# Patient Record
Sex: MALE | Race: WHITE | NOT HISPANIC OR LATINO | Employment: FULL TIME | URBAN - METROPOLITAN AREA
[De-identification: names, ages, dates, MRNs, and addresses within clinical notes are randomized per-mention and may not be internally consistent; named-entity substitution may affect disease eponyms.]

---

## 2018-06-26 ENCOUNTER — OFFICE VISIT (OUTPATIENT)
Dept: NEUROLOGY | Facility: CLINIC | Age: 28
End: 2018-06-26
Payer: COMMERCIAL

## 2018-06-26 VITALS
WEIGHT: 127 LBS | DIASTOLIC BLOOD PRESSURE: 78 MMHG | SYSTOLIC BLOOD PRESSURE: 112 MMHG | HEART RATE: 91 BPM | HEIGHT: 68 IN | BODY MASS INDEX: 19.25 KG/M2

## 2018-06-26 DIAGNOSIS — G43.111 INTRACTABLE MIGRAINE WITH AURA WITH STATUS MIGRAINOSUS: ICD-10-CM

## 2018-06-26 DIAGNOSIS — IMO0002 CHRONIC MIGRAINE: Primary | ICD-10-CM

## 2018-06-26 PROCEDURE — 99244 OFF/OP CNSLTJ NEW/EST MOD 40: CPT | Performed by: PSYCHIATRY & NEUROLOGY

## 2018-06-26 RX ORDER — SUMATRIPTAN 100 MG/1
50 TABLET, FILM COATED ORAL ONCE AS NEEDED
COMMUNITY
End: 2018-06-26 | Stop reason: SDUPTHER

## 2018-06-26 RX ORDER — BUTALBITAL, ACETAMINOPHEN AND CAFFEINE 50; 325; 40 MG/1; MG/1; MG/1
1 TABLET ORAL DAILY PRN
Qty: 30 TABLET | Refills: 0 | Status: SHIPPED | OUTPATIENT
Start: 2018-06-26 | End: 2019-12-16 | Stop reason: ALTCHOICE

## 2018-06-26 RX ORDER — SUMATRIPTAN 100 MG/1
50 TABLET, FILM COATED ORAL ONCE AS NEEDED
Qty: 9 TABLET | Refills: 0 | Status: SHIPPED | OUTPATIENT
Start: 2018-06-26 | End: 2018-08-18 | Stop reason: SDUPTHER

## 2018-06-26 RX ORDER — VERAPAMIL HYDROCHLORIDE 120 MG/1
120 CAPSULE, EXTENDED RELEASE ORAL
Qty: 30 CAPSULE | Refills: 3 | Status: SHIPPED | OUTPATIENT
Start: 2018-06-26 | End: 2018-10-30 | Stop reason: SDUPTHER

## 2018-06-26 NOTE — PROGRESS NOTES
Outpatient Neurology History and Physical  Olive Beaver  [id number]  89 y o   1990          Consult: Yes    Mari Desai MD      Chief Complaint   Patient presents with    Migraine     NP           History Obtained from: Patient     HPI:     Mr Tha Watson is a 34 yo M with PMH of migraines presents to establish care  He started getting migraines at age of 22  At first it was thought to be due to use of cleaning agents but that was not the case  He describes It as starting out with visual aura, stabbing pain in eye ball, then headache builds on right side of head  He gets nausea and rarely vomiting with them  He gets very sensitive to light and noise  He says he was tried on topamax ( caused weight loss), elavil (sleepiness), propranolol and they were all ineffective  He takes imitrex daily by cutting it into 4  It works majority of time  He was ordered to have MRI brain but it was denied by his insurance  Coffee, stress, smell of gasoline can trigger them  He says he gets them daily and if he doesn't take triptan intensity is 10/10  They can last about 2-3 hours  Denies any focal paresthesia or weakness  His grandmother had h/o migraines  Past Medical History:   Diagnosis Date    Chronic migraine     Migraines                No current outpatient prescriptions on file prior to visit  No current facility-administered medications on file prior to visit          Allergies   Allergen Reactions    Amitriptyline Fatigue and Drowsiness    Propranolol      NO RELIEF     Topamax [Topiramate] Other (See Comments)     Rapid weight loss         Family History   Problem Relation Age of Onset    No Known Problems Mother     No Known Problems Father     No Known Problems Sister                 Past Surgical History:   Procedure Laterality Date    APPENDECTOMY             Social History     Social History    Marital status: Single     Spouse name: N/A    Number of children: N/A    Years of education: N/A     Occupational History    Not on file       Social History Main Topics    Smoking status: Current Every Day Smoker    Smokeless tobacco: Never Used    Alcohol use No    Drug use: No    Sexual activity: Not on file     Other Topics Concern    Not on file     Social History Narrative    No narrative on file       Review of Systems  Refer to positive review of systems in HPI  Constitutional- No fever  Eyes- No visual change  ENT- Hearing normal  CV- No chest pain  Resp- No Shortness of breath  GI- No diarrhea  - Bladder normal  MS- No Arthritis   Skin- No rash  Psych- No depression  Endo- No DM  Heme- No nodes    PHYSICAL EXAM:    Vitals:    06/26/18 1520   BP: 112/78   BP Location: Left arm   Patient Position: Sitting   Pulse: 91   Weight: 57 6 kg (127 lb)   Height: 5' 8" (1 727 m)         Appearance: No Acute Distress  Ophthalmoscopic: Disc Flat, Normal fundus  Carotid/Heart/Peripheral Vascular: No Bruits, RRR  Orientation: Awake, Alert, and Oriented x 3  Mental status:  Memory: Registation 3/3 Recall 3/3  Attention: Normal  Knowledge: Appropriate  Language: No aphasia  Speech: No dysarthria  Cranial Nerves:  2 No Visual Defect on Confrontation; Pupils round, equal, reactive to light  3,4,6 Extraocular Movements Intact; no nystagmus  5 Facial Sensation Intact  7 No facial asymmetry  8 Intact hearing  9,10 Palate symmetric, normal gag  11 Good shoulder shrug  12 Tongue Midline  Gait: Stable, No ataxia, can perform tandem walking  Coordination: No ataxia with finger to nose testing and heel to shin testing  Sensory: Intact, Symmetric to Pinprick, Light Touch, Vibration, and Joint Position  Muscle Tone: Normal  Muscle exam  Arm Right Left Leg Right Left   Deltoid 5/5 5/5 Iliopsoas 5/5 5/5   Biceps 5/5 5/5 Quads 5/5 5/5   Triceps 5/5 5/5 Hamstrings 5/5 5/5   Wrist Extension 5/5 5/5 Ankle Dorsi Flexion 5/5 5/5   Wrist Flexion 5/5 5/5 Ankle Plantar Flexion 5/5 5/5   Interossei 5/5 5/5 Ankle Eversion 5/5 5/5 APB 5/5 5/5 Ankle Inversion 5/5 5/5       Reflexes   RJ BJ TJ KJ AJ Plantars Pagan's   Right 2+ 2+ 2+ 2+ 2+ Downgoing Not present   Left 2+ 2+ 2+ 2+ 2+ Downgoing Not present               Personal review of            Assessment/Plan:     1  Chronic migraine  verapamil (VERELAN PM) 120 MG 24 hr capsule    butalbital-acetaminophen-caffeine (FIORICET,ESGIC) -40 mg per tablet    SUMAtriptan (IMITREX) 100 mg tablet   2  Intractable migraine with aura with status migrainosus  verapamil (VERELAN PM) 120 MG 24 hr capsule    butalbital-acetaminophen-caffeine (FIORICET,ESGIC) -40 mg per tablet    SUMAtriptan (IMITREX) 100 mg tablet     Patient is suffering from frequent, chronic migraines  Will try placing him on verapamil for prevention  Will give fioricet as prn  Will resume prn imitrex  Will start him on Aimovig 140mg injection once monthly  If no improvement with above, will consider botox injections  Counseling Documentation:  The patient and/or patient's family were  counseled regarding diagnostic results  Instructions for management,risk factor reductions,prognosis of disease were discussed  Patient and family were educated regarding impressions,risks and benefits of treatment options,importance of compliance with treatment  Total time of encounter: 45 min  More than 50% of time was spent in counseling and coordination of care of patient  [facility]  [facility] [address]  [address]

## 2018-06-27 ENCOUNTER — TELEPHONE (OUTPATIENT)
Dept: NEUROLOGY | Facility: CLINIC | Age: 28
End: 2018-06-27

## 2018-06-27 NOTE — TELEPHONE ENCOUNTER
Spoke with Kathia at [facility]- gave verbal for generic Fioricet  Qty  30 1 tab daily PRN for migraine  0 refills

## 2018-08-18 DIAGNOSIS — G43.111 INTRACTABLE MIGRAINE WITH AURA WITH STATUS MIGRAINOSUS: ICD-10-CM

## 2018-08-18 DIAGNOSIS — IMO0002 CHRONIC MIGRAINE: ICD-10-CM

## 2018-08-20 ENCOUNTER — TELEPHONE (OUTPATIENT)
Dept: NEUROLOGY | Facility: CLINIC | Age: 28
End: 2018-08-20

## 2018-08-20 NOTE — TELEPHONE ENCOUNTER
Verbal Rx given to Carlton Arce at [facility] for Sumatriptan 100 mg tab  0 5 tab by mouth once daily PRN for migraine  Qty: 9 1 refill

## 2018-08-24 RX ORDER — SUMATRIPTAN 100 MG/1
TABLET, FILM COATED ORAL
Qty: 9 TABLET | Refills: 0 | Status: SHIPPED | OUTPATIENT
Start: 2018-08-24 | End: 2018-11-08 | Stop reason: SDUPTHER

## 2018-10-30 DIAGNOSIS — IMO0002 CHRONIC MIGRAINE: ICD-10-CM

## 2018-10-30 DIAGNOSIS — G43.111 INTRACTABLE MIGRAINE WITH AURA WITH STATUS MIGRAINOSUS: ICD-10-CM

## 2018-10-30 RX ORDER — VERAPAMIL HYDROCHLORIDE 120 MG/1
CAPSULE, EXTENDED RELEASE ORAL
Qty: 30 CAPSULE | Refills: 2 | Status: SHIPPED | OUTPATIENT
Start: 2018-10-30 | End: 2019-02-03 | Stop reason: SDUPTHER

## 2018-11-08 DIAGNOSIS — G43.111 INTRACTABLE MIGRAINE WITH AURA WITH STATUS MIGRAINOSUS: ICD-10-CM

## 2018-11-08 DIAGNOSIS — IMO0002 CHRONIC MIGRAINE: ICD-10-CM

## 2018-11-08 RX ORDER — SUMATRIPTAN 100 MG/1
TABLET, FILM COATED ORAL
Qty: 9 TABLET | Refills: 0 | Status: SHIPPED | OUTPATIENT
Start: 2018-11-08 | End: 2018-11-23 | Stop reason: SDUPTHER

## 2018-11-23 DIAGNOSIS — G43.111 INTRACTABLE MIGRAINE WITH AURA WITH STATUS MIGRAINOSUS: ICD-10-CM

## 2018-11-23 DIAGNOSIS — IMO0002 CHRONIC MIGRAINE: ICD-10-CM

## 2018-11-23 RX ORDER — SUMATRIPTAN 100 MG/1
TABLET, FILM COATED ORAL
Qty: 2 TABLET | Refills: 0 | Status: SHIPPED | OUTPATIENT
Start: 2018-11-23 | End: 2018-12-20 | Stop reason: SDUPTHER

## 2018-11-24 DIAGNOSIS — IMO0002 CHRONIC MIGRAINE: ICD-10-CM

## 2018-11-24 DIAGNOSIS — G43.111 INTRACTABLE MIGRAINE WITH AURA WITH STATUS MIGRAINOSUS: ICD-10-CM

## 2018-11-26 RX ORDER — SUMATRIPTAN 100 MG/1
TABLET, FILM COATED ORAL
Qty: 9 TABLET | Refills: 0 | Status: SHIPPED | OUTPATIENT
Start: 2018-11-26 | End: 2019-01-21 | Stop reason: DRUGHIGH

## 2018-12-20 DIAGNOSIS — IMO0002 CHRONIC MIGRAINE: ICD-10-CM

## 2018-12-20 DIAGNOSIS — G43.111 INTRACTABLE MIGRAINE WITH AURA WITH STATUS MIGRAINOSUS: ICD-10-CM

## 2018-12-20 RX ORDER — SUMATRIPTAN 100 MG/1
TABLET, FILM COATED ORAL
Qty: 9 TABLET | Refills: 0 | Status: SHIPPED | OUTPATIENT
Start: 2018-12-20 | End: 2019-01-15 | Stop reason: SDUPTHER

## 2019-01-15 DIAGNOSIS — IMO0002 CHRONIC MIGRAINE: ICD-10-CM

## 2019-01-15 DIAGNOSIS — G43.111 INTRACTABLE MIGRAINE WITH AURA WITH STATUS MIGRAINOSUS: ICD-10-CM

## 2019-01-15 RX ORDER — SUMATRIPTAN 100 MG/1
TABLET, FILM COATED ORAL
Qty: 2 TABLET | Refills: 0 | Status: SHIPPED | OUTPATIENT
Start: 2019-01-15 | End: 2019-04-06 | Stop reason: SDUPTHER

## 2019-01-19 DIAGNOSIS — G43.111 INTRACTABLE MIGRAINE WITH AURA WITH STATUS MIGRAINOSUS: ICD-10-CM

## 2019-01-19 DIAGNOSIS — IMO0002 CHRONIC MIGRAINE: ICD-10-CM

## 2019-01-21 RX ORDER — SUMATRIPTAN 100 MG/1
TABLET, FILM COATED ORAL
Qty: 9 TABLET | Refills: 0 | Status: SHIPPED | OUTPATIENT
Start: 2019-01-21 | End: 2019-02-16 | Stop reason: SDUPTHER

## 2019-02-03 DIAGNOSIS — IMO0002 CHRONIC MIGRAINE: ICD-10-CM

## 2019-02-03 DIAGNOSIS — G43.111 INTRACTABLE MIGRAINE WITH AURA WITH STATUS MIGRAINOSUS: ICD-10-CM

## 2019-02-04 DIAGNOSIS — G43.111 INTRACTABLE MIGRAINE WITH AURA WITH STATUS MIGRAINOSUS: ICD-10-CM

## 2019-02-04 DIAGNOSIS — IMO0002 CHRONIC MIGRAINE: ICD-10-CM

## 2019-02-04 RX ORDER — VERAPAMIL HYDROCHLORIDE 120 MG/1
CAPSULE, EXTENDED RELEASE ORAL
Qty: 30 CAPSULE | Refills: 1 | Status: SHIPPED | OUTPATIENT
Start: 2019-02-04 | End: 2019-02-04 | Stop reason: SDUPTHER

## 2019-02-04 RX ORDER — VERAPAMIL HYDROCHLORIDE 120 MG/1
120 CAPSULE, EXTENDED RELEASE ORAL
Qty: 30 CAPSULE | Refills: 4 | Status: SHIPPED | OUTPATIENT
Start: 2019-02-04 | End: 2019-09-05 | Stop reason: SDUPTHER

## 2019-02-16 DIAGNOSIS — IMO0002 CHRONIC MIGRAINE: ICD-10-CM

## 2019-02-16 DIAGNOSIS — G43.111 INTRACTABLE MIGRAINE WITH AURA WITH STATUS MIGRAINOSUS: ICD-10-CM

## 2019-02-19 RX ORDER — SUMATRIPTAN 100 MG/1
TABLET, FILM COATED ORAL
Qty: 9 TABLET | Refills: 0 | Status: SHIPPED | OUTPATIENT
Start: 2019-02-19 | End: 2019-03-18 | Stop reason: SDUPTHER

## 2019-03-18 DIAGNOSIS — G43.111 INTRACTABLE MIGRAINE WITH AURA WITH STATUS MIGRAINOSUS: ICD-10-CM

## 2019-03-18 DIAGNOSIS — IMO0002 CHRONIC MIGRAINE: ICD-10-CM

## 2019-03-19 RX ORDER — SUMATRIPTAN 100 MG/1
TABLET, FILM COATED ORAL
Qty: 9 TABLET | Refills: 0 | Status: SHIPPED | OUTPATIENT
Start: 2019-03-19 | End: 2019-04-05 | Stop reason: SDUPTHER

## 2019-04-05 DIAGNOSIS — IMO0002 CHRONIC MIGRAINE: ICD-10-CM

## 2019-04-05 DIAGNOSIS — G43.111 INTRACTABLE MIGRAINE WITH AURA WITH STATUS MIGRAINOSUS: ICD-10-CM

## 2019-04-06 RX ORDER — SUMATRIPTAN 100 MG/1
TABLET, FILM COATED ORAL
Qty: 9 TABLET | Refills: 0 | Status: SHIPPED | OUTPATIENT
Start: 2019-04-06 | End: 2019-05-09 | Stop reason: SDUPTHER

## 2019-05-09 DIAGNOSIS — IMO0002 CHRONIC MIGRAINE: ICD-10-CM

## 2019-05-09 DIAGNOSIS — G43.111 INTRACTABLE MIGRAINE WITH AURA WITH STATUS MIGRAINOSUS: ICD-10-CM

## 2019-05-10 RX ORDER — SUMATRIPTAN 100 MG/1
TABLET, FILM COATED ORAL
Qty: 9 TABLET | Refills: 0 | Status: SHIPPED | OUTPATIENT
Start: 2019-05-10 | End: 2019-06-10 | Stop reason: SDUPTHER

## 2019-06-10 ENCOUNTER — TELEPHONE (OUTPATIENT)
Dept: NEUROLOGY | Facility: CLINIC | Age: 29
End: 2019-06-10

## 2019-06-10 DIAGNOSIS — IMO0002 CHRONIC MIGRAINE: ICD-10-CM

## 2019-06-10 DIAGNOSIS — G43.111 INTRACTABLE MIGRAINE WITH AURA WITH STATUS MIGRAINOSUS: ICD-10-CM

## 2019-06-10 RX ORDER — SUMATRIPTAN 100 MG/1
TABLET, FILM COATED ORAL
Qty: 2 TABLET | Refills: 0 | Status: SHIPPED | OUTPATIENT
Start: 2019-06-10 | End: 2019-07-01 | Stop reason: SDUPTHER

## 2019-07-01 DIAGNOSIS — G43.111 INTRACTABLE MIGRAINE WITH AURA WITH STATUS MIGRAINOSUS: ICD-10-CM

## 2019-07-01 DIAGNOSIS — IMO0002 CHRONIC MIGRAINE: ICD-10-CM

## 2019-07-01 RX ORDER — SUMATRIPTAN 100 MG/1
TABLET, FILM COATED ORAL
Qty: 9 TABLET | Refills: 0 | Status: SHIPPED | OUTPATIENT
Start: 2019-07-01 | End: 2019-07-19 | Stop reason: SDUPTHER

## 2019-07-19 DIAGNOSIS — IMO0002 CHRONIC MIGRAINE: ICD-10-CM

## 2019-07-19 DIAGNOSIS — G43.111 INTRACTABLE MIGRAINE WITH AURA WITH STATUS MIGRAINOSUS: ICD-10-CM

## 2019-07-19 RX ORDER — SUMATRIPTAN 100 MG/1
TABLET, FILM COATED ORAL
Qty: 9 TABLET | Refills: 0 | Status: SHIPPED | OUTPATIENT
Start: 2019-07-19 | End: 2019-08-11 | Stop reason: SDUPTHER

## 2019-08-11 DIAGNOSIS — G43.111 INTRACTABLE MIGRAINE WITH AURA WITH STATUS MIGRAINOSUS: ICD-10-CM

## 2019-08-11 DIAGNOSIS — IMO0002 CHRONIC MIGRAINE: ICD-10-CM

## 2019-08-12 RX ORDER — SUMATRIPTAN 100 MG/1
TABLET, FILM COATED ORAL
Qty: 9 TABLET | Refills: 0 | Status: SHIPPED | OUTPATIENT
Start: 2019-08-12 | End: 2019-09-03 | Stop reason: SDUPTHER

## 2019-08-28 DIAGNOSIS — G43.111 INTRACTABLE MIGRAINE WITH AURA WITH STATUS MIGRAINOSUS: ICD-10-CM

## 2019-08-28 DIAGNOSIS — IMO0002 CHRONIC MIGRAINE: ICD-10-CM

## 2019-08-28 RX ORDER — SUMATRIPTAN 100 MG/1
TABLET, FILM COATED ORAL
Qty: 9 TABLET | Refills: 0 | OUTPATIENT
Start: 2019-08-28

## 2019-08-30 RX ORDER — DEXAMETHASONE 2 MG/1
TABLET ORAL
Qty: 5 TABLET | Refills: 0 | Status: SHIPPED | OUTPATIENT
Start: 2019-08-30 | End: 2019-12-16 | Stop reason: ALTCHOICE

## 2019-08-30 NOTE — TELEPHONE ENCOUNTER
Patient agreeable to steroid taper  He stated he never started the aimovig because the insurance was giving him "the runaround"  He is agreeable to trying another one such as emgality, but we don't know if his insurance will cover it  Patient still questioning when the imitrex can be sent to pharm

## 2019-08-30 NOTE — TELEPHONE ENCOUNTER
Patient questioning why his imitrex was not sent to pharm  Informed him it was too soon to be filled  Patient stated  imitrex keeps him functioning  He reports he's been getting an increase in migraines lately, at least one per day, he thinks they might be weather related  He does split up the imitrex into quarter pieces to take throughout the day  He does take verapamil daily, but doesn't think it's very helpful  Doesn't use the fioricet because it doesn't really help  He's unsure of other meds that would be helpful besides the imitrex but he is agreeable to trying something other than a triptan  He currently does not have a migraine  Patient has tried and failed: topamax (caused weight loss), elavil (sleepiness), propranolol  Please send something to [facility]  Patient requesting a call back

## 2019-09-03 RX ORDER — SUMATRIPTAN 100 MG/1
TABLET, FILM COATED ORAL
Qty: 9 TABLET | Refills: 0 | Status: SHIPPED | OUTPATIENT
Start: 2019-09-03

## 2019-09-03 NOTE — TELEPHONE ENCOUNTER
I sent imitrex refill for him to  no earlier than 9/10/19  There is a note for pharmacy to address this  I sent the emgality as well

## 2019-09-04 ENCOUNTER — TELEPHONE (OUTPATIENT)
Dept: NEUROLOGY | Facility: CLINIC | Age: 29
End: 2019-09-04

## 2019-09-04 NOTE — TELEPHONE ENCOUNTER
Patient is scheduled for 10/17 with [facility] and in December with Dr Johnna Velasquez

## 2019-09-04 NOTE — TELEPHONE ENCOUNTER
For reference when time for next refill, please have patient schedule! Hasn't been seen since June 2018   Was only seen for a consult and cancelled the 3 month f/up

## 2019-09-04 NOTE — TELEPHONE ENCOUNTER
Submitted PA for [facility] via [facility]   Key - [id number]

## 2019-09-04 NOTE — TELEPHONE ENCOUNTER
L/m for patient that he needs to be seen for an appt  Has not been seen since June 2018 and the 3 month f/up last September was cancelled  Advised that once an appt is scheduled, we can provide refills until the appt date but if he cancels the appt or doesn't come, we will not be able to continue giving rx's  Asked for a c/b to schedule  Please do not provide any refills until patient is scheduled  Last office visit was a Consult on 6/26/18

## 2019-09-05 DIAGNOSIS — IMO0002 CHRONIC MIGRAINE: ICD-10-CM

## 2019-09-05 DIAGNOSIS — G43.111 INTRACTABLE MIGRAINE WITH AURA WITH STATUS MIGRAINOSUS: ICD-10-CM

## 2019-09-05 RX ORDER — VERAPAMIL HYDROCHLORIDE 120 MG/1
CAPSULE, EXTENDED RELEASE ORAL
Qty: 30 CAPSULE | Refills: 3 | Status: SHIPPED | OUTPATIENT
Start: 2019-09-05 | End: 2019-12-16 | Stop reason: ALTCHOICE

## 2019-10-17 ENCOUNTER — OFFICE VISIT (OUTPATIENT)
Dept: NEUROLOGY | Facility: CLINIC | Age: 29
End: 2019-10-17
Payer: COMMERCIAL

## 2019-10-17 VITALS
DIASTOLIC BLOOD PRESSURE: 70 MMHG | WEIGHT: 140 LBS | SYSTOLIC BLOOD PRESSURE: 112 MMHG | HEART RATE: 81 BPM | BODY MASS INDEX: 21.29 KG/M2

## 2019-10-17 DIAGNOSIS — IMO0002 CHRONIC MIGRAINE: Primary | ICD-10-CM

## 2019-10-17 DIAGNOSIS — G44.099 TRIGEMINAL AUTONOMIC CEPHALGIAS: ICD-10-CM

## 2019-10-17 PROCEDURE — 99214 OFFICE O/P EST MOD 30 MIN: CPT | Performed by: PHYSICIAN ASSISTANT

## 2019-10-17 RX ORDER — INDOMETHACIN 25 MG/1
CAPSULE ORAL
Qty: 20 CAPSULE | Refills: 0 | Status: SHIPPED | OUTPATIENT
Start: 2019-10-17

## 2019-10-17 RX ORDER — ZONISAMIDE 25 MG/1
CAPSULE ORAL
Qty: 60 CAPSULE | Refills: 2 | Status: SHIPPED | OUTPATIENT
Start: 2019-10-17 | End: 2019-12-16 | Stop reason: ALTCHOICE

## 2019-10-17 RX ORDER — SUMATRIPTAN 6 MG/.5ML
INJECTION, SOLUTION SUBCUTANEOUS
Qty: 1 ML | Refills: 0 | Status: SHIPPED | OUTPATIENT
Start: 2019-10-17 | End: 2019-12-16 | Stop reason: ALTCHOICE

## 2019-10-17 NOTE — PROGRESS NOTES
Patient ID: Anurag Herron is a 30 y o  male  Assessment/Plan:    Chronic migraine  Migraine vs TAC vs cluster  Less likely cluster due to longer duration of headache- which is sometimes longer than that of cluster  Will try PA for Emgality  S/e and demo reviewed with pt  Not sure botox would help this pt but he will have opinion of Dr Stuart Venegas in December to make better decision  Pt agreed to zonegran for prevention- s/e reviewed  Continue imitrex prn migraine, imitrex inj at night (migraine awakens him from sleep 1-2 times per month)  Urged no more than 2-3 doses per week to prevent [facility] and other vascular issues  He understands this  Indocin prn headache with food  Diagnoses and all orders for this visit:    Chronic migraine  -     zonisamide (ZONEGRAN) 25 mg capsule; 1 tab qhs x 1 week, then 2 caps qhs  -     indomethacin (INDOCIN) 25 mg capsule; 1-2 tabs TID prn headache with food  -     SUMAtriptan (IMITREX) 6 mg/0 5 mL; 1 subQ injection prn night time migraine  Repeat after 1 hour if needed  No more than 2 per day  -     TSH, 3rd generation with Free T4 reflex; Future  -     Comprehensive metabolic panel; Future  -     CBC and differential; Future  -     Vitamin B12; Future  -     Vitamin D 1,25 dihydroxy; Future  -     TSH, 3rd generation with Free T4 reflex  -     Comprehensive metabolic panel  -     CBC and differential  -     Vitamin B12  -     Vitamin D 1,25 dihydroxy    Trigeminal autonomic cephalgias       The patient should not hesitate to call me prior to his follow up with any questions or concerns  The patient was instructed to urgently call 911 or present to the nearest emergency room with any new or worsening neurological deficits  Subjective:    HPI    Mr Anurag Herron is a very pleasant 31 yo male here for migraine follow up  He works in environmental education  He last saw Dr Dorothy Delgado on 6/28/18  He previously saw neurologist Dr Phan Mcdermott in [address]      At first migraines were thought to be due to use of cleaning agents bleach) but that was not the case  He describes It as starting out with visual aura, stabbing pain in eye ball, then headache builds on right side of head/ right temple  He gets nausea and rarely vomiting with them  He gets very sensitive to light and noise  He says he was tried on topamax (caused weight loss), elavil (sleepiness), propranolol, depakote, verapamil since last seen, and they were all ineffective  He takes imitrex almost daily by cutting it into quarters  It works majority of time- it is the only thing that works  Since last seen brain MRI was finally approved and done in [address] at [address], unremarkable per his history  Will try to obtain this report  Onset: 11-15 yo is when he developed mild headaches; at that time he was also diagnosed with celiac's disease but "grew out of it"  Head injury(?): denies    Current pain: 0/10  Up to 9-10/10  Frequency: 1-2 migraines per day  Duration: 2-3 hours, unless takes imitrex and that will work w/in 1/2 hour  Location: right temporal region, right orbital  Quality: sharp, ice pick, knife like  Worse with: sneezing, coughing, standing up too quickly  Associated with: nausea- sometimes, photophobia, phonophobia, blurry vision from right eye, no focal numbness, tingling or weakness, no ptosis, sometimes tearing from right eye    Normal dilated eye exam recently  He is also changing his diet to more natural foods- making his own food      Triggers: Coffee (stopped drinking coffee and now drinks tea, but no change in headaches), stress, smell of gasoline, season changes, weather changes    Aura/ warning: sometimes blurry vision in right eye    Medications tried:  Prevention- depakote, propranolol, topamax, verapamil, elavil  Abortive- naproxen, maxalt, imitrex po (triptans seem to be the only effective med), imitrex inj- sample from other neurologist's office  Other non-medication therapies or treatments- Recently saw chiropractor- told to do certain exercises    Neck pain and description: denies  Sleep concerns: denies, although sometimes migraine wakes him up from sleep- about 1-2 times per month on average    Family hx of migraines: paternal cousin- cluster HAs; maternal GM- mgraines  Family hx of cerebral aneurysms: denies    The following portions of the patient's history were reviewed and updated as appropriate: He  has a past medical history of Chronic migraine and Migraines  He   Patient Active Problem List    Diagnosis Date Noted    Chronic migraine 10/17/2019    Trigeminal autonomic cephalgias 10/17/2019     He  has a past surgical history that includes Appendectomy  His family history includes No Known Problems in his father, mother, and sister  He  reports that he has been smoking  He has never used smokeless tobacco  He reports that he does not drink alcohol or use drugs  Current Outpatient Medications   Medication Sig Dispense Refill    SUMAtriptan (IMITREX) 100 mg tablet 1 tab at migraine onset, repeat after 2 hours if needed  No more than 2 per 24 hours or 3 per week  9 tablet 0    verapamil (VERELAN PM) 120 MG 24 hr capsule TAKE ONE CAPSULE (120 MG TOTAL) BY MOUTH ONE TIME DAILY AFTER DINNER 30 capsule 3    butalbital-acetaminophen-caffeine (FIORICET,ESGIC) -40 mg per tablet Take 1 tablet by mouth daily as needed for migraine (Patient not taking: Reported on 10/17/2019) 30 tablet 0    dexamethasone (DECADRON) 2 mg tablet 1 tab qam with food prn migraine  (Patient not taking: Reported on 10/17/2019) 5 tablet 0    Galcanezumab-gnlm (EMGALITY) 120 MG/ML SOAJ One ml subcutaneous on the right thigh and 1 ml subcutaneous on the left thigh at the same time for 1 dose (Patient not taking: Reported on 10/17/2019) 2 pen 0    Galcanezumab-gnlm (EMGALITY) 120 MG/ML SOAJ 30 days after loading dose, inject 1 pen subq every 30 days   (Patient not taking: Reported on 10/17/2019) 1 pen 2  indomethacin (INDOCIN) 25 mg capsule 1-2 tabs TID prn headache with food 20 capsule 0    SUMAtriptan (IMITREX) 6 mg/0 5 mL 1 subQ injection prn night time migraine  Repeat after 1 hour if needed  No more than 2 per day  1 mL 0    zonisamide (ZONEGRAN) 25 mg capsule 1 tab qhs x 1 week, then 2 caps qhs 60 capsule 2     No current facility-administered medications for this visit  He is allergic to amitriptyline; propranolol; and topamax [topiramate]            Objective:    Blood pressure 112/70, pulse 81, weight 63 5 kg (140 lb)  Physical Exam    Neurological Exam  Vital signs reviewed  Well developed, well nourished  Head: Normocephalic, atraumatic  Neck: Neck flexors 5/5  CN 2-12: intact and symmetric, including EOMs which are normal b/l and PERRL  Fundi b/l are normal to crude ophthalmological examination  MSK: 5/5 t/o  ROM normal x all 4 extr  No pronator drift  Sensation: Inact to LT and temp x4 extr  Reflexes: 2+ and symmetric in all 4 extr  Coordination: Nml x4 extr  Gait: Steady normal gait  ROS:    Review of Systems   Constitutional: Negative  Negative for appetite change and fever  HENT: Negative  Negative for hearing loss, tinnitus, trouble swallowing and voice change  Eyes: Negative  Negative for photophobia and pain  Respiratory: Negative  Negative for shortness of breath  Cardiovascular: Negative  Negative for palpitations  Gastrointestinal: Negative  Negative for nausea and vomiting  Endocrine: Negative  Negative for cold intolerance and heat intolerance  Genitourinary: Negative  Negative for dysuria, frequency and urgency  Musculoskeletal: Negative  Negative for myalgias and neck pain  Skin: Negative  Negative for rash  Neurological: Positive for headaches  Negative for dizziness, tremors, seizures, syncope, facial asymmetry, speech difficulty, weakness, light-headedness and numbness  Hematological: Negative  Does not bruise/bleed easily  Psychiatric/Behavioral: Negative  Negative for confusion, hallucinations and sleep disturbance  The following portions of the patient's history were reviewed and updated as appropriate: allergies, current medications/ medication history, past family history, past medical history, past social history, past surgical history and problem list     Review of systems was reviewed and otherwise unremarkable from a neurological perspective

## 2019-10-18 ENCOUNTER — TELEPHONE (OUTPATIENT)
Dept: NEUROLOGY | Facility: CLINIC | Age: 29
End: 2019-10-18

## 2019-10-18 DIAGNOSIS — IMO0002 CHRONIC MIGRAINE: Primary | ICD-10-CM

## 2019-10-18 NOTE — TELEPHONE ENCOUNTER
PA submitted for Emgality through [facility]  Key: [id number]  Awaiting determination  [place] [address] [place]/ Remington Figueroa [phone]      ID: [id number]  [facility] [id number]

## 2019-10-18 NOTE — ASSESSMENT & PLAN NOTE
Migraine vs TAC vs cluster  Less likely cluster due to longer duration of headache- which is sometimes longer than that of cluster  Will try PA for Emgality  S/e and demo reviewed with pt  Not sure botox would help this pt but he will have opinion of Dr Mariel Yeboah in December to make better decision  Pt agreed to zonegran for prevention- s/e reviewed  Continue imitrex prn migraine, imitrex inj at night (migraine awakens him from sleep 1-2 times per month)  Urged no more than 2-3 doses per week to prevent [facility] and other vascular issues  He understands this  Indocin prn headache with food

## 2019-10-18 NOTE — TELEPHONE ENCOUNTER
Fax received from pharmacy  Sumatriptan requires PA  PA initiated through [facility]  Key: [id number]  Awaiting determination

## 2019-10-29 ENCOUNTER — TELEPHONE (OUTPATIENT)
Dept: NEUROLOGY | Facility: CLINIC | Age: 29
End: 2019-10-29

## 2019-10-29 NOTE — TELEPHONE ENCOUNTER
received fax from [place] stating that sumatriptan injections needs PA   this was already completed  we did not receive determination yet  i called [phone] and had to leave a message for a return call due to high call volume    will await call back

## 2019-11-01 NOTE — TELEPHONE ENCOUNTER
See encounter from 10/29 for further info  Spoke to insurance and emgality is denied  They will fax denial letter    Will await fax

## 2019-11-01 NOTE — TELEPHONE ENCOUNTER
If pt is okay with trying aimovig that is fine with me  I think we discussed it in the last office visit  I cannot recall if he said he wanted to try the lower dose or 140mg  He may have mentioned that constipation would not be a desirable s/e for him, which can occur with aimovig  Thanks

## 2019-11-01 NOTE — TELEPHONE ENCOUNTER
Gregorio matos from [facility] called regarding the [place], reports that this was denied because it is non-formulary, and Dick De Luna is required to be tried first      Please advise if you would like to order Aimovig  This will also still require a PA       We can submit documentation to:     Fax: [phone]  Phone: [phone] ext [phone]

## 2019-11-01 NOTE — TELEPHONE ENCOUNTER
Called [phone] and spoke to [address]  States that They did not receive PA for sumatriptan injections  She states that they received a PA for emgality  I do not see that we received a determination for this either  She states that emgality has been denied  She will fax denial letter to [phone]  States that Sumatriptan injection does not need a PA, covered by insurance  They need to fill with a different NDC  Can use [facility]  If don't have a different ndc will have to go to another pharmacy  The ndc they have been using no longer has a rebate contract with the state  Called pharm and made them aware  They will look into this and contact pt if script needs to be transferred to another pharm

## 2019-11-04 NOTE — TELEPHONE ENCOUNTER
Pt called and advised pt of all of the below  He verbalized clear understanding  He would like to try aimovig 70 mg  Pls send updated rx to [facility]  Thanks            [phone] ok to leave detailed message

## 2019-11-05 NOTE — TELEPHONE ENCOUNTER
Received fax from [facility]UST[facility]  62  requesting to complete Aimovig 70 mg PA, key# [id number]     PA completed  Awaiting determination

## 2019-11-11 NOTE — TELEPHONE ENCOUNTER
No determination received  called Deirdre Lopez at [phone] and spoke to [place]  she states that form the they received did not have our fax number on it  in [facility], there is a fax number listed  she states that med was denied as info was missing as form that was received was the wrong form  PA completed over the phone  will need to fax clinicals to [phone]  She will luna this as urgent  She requested that pt's ID # be listed on first page of office notes  Added ID # to cover sheet and first page of office note  Office note faxed  Received faxed confirmation  Will await determination

## 2019-11-13 NOTE — TELEPHONE ENCOUNTER
Called PA dept to check status of PA  Spoke w/ Arianne Gonzalez and states that   PA has been approved  Effective date 11/11/19 through 2/10/2020  Approval #[id number]  [place] pharmacy made aware  Called and left a message on pt's answering letting him know that Aimovig PA has been approved

## 2019-12-04 ENCOUNTER — TELEPHONE (OUTPATIENT)
Dept: NEUROLOGY | Facility: CLINIC | Age: 29
End: 2019-12-04

## 2019-12-04 NOTE — TELEPHONE ENCOUNTER
Received fax stating PA is needed for Aimovig 140mg/mL  Patient is prescribed Aimovig 70mg/mL and this has been approved with his insurance  JUDAH Hastings and spoke with Rivera [place]  She states she ran [employer] and this went through  She states they will order the medication and this should be available for patient to  tomorrow

## 2019-12-16 ENCOUNTER — CONSULT (OUTPATIENT)
Dept: NEUROLOGY | Facility: CLINIC | Age: 29
End: 2019-12-16
Payer: COMMERCIAL

## 2019-12-16 VITALS
BODY MASS INDEX: 21.07 KG/M2 | HEIGHT: 68 IN | HEART RATE: 87 BPM | SYSTOLIC BLOOD PRESSURE: 107 MMHG | DIASTOLIC BLOOD PRESSURE: 71 MMHG | WEIGHT: 139 LBS

## 2019-12-16 DIAGNOSIS — F12.90 MARIJUANA USE, CONTINUOUS: ICD-10-CM

## 2019-12-16 DIAGNOSIS — M54.2 CERVICALGIA: ICD-10-CM

## 2019-12-16 DIAGNOSIS — Z72.0 TOBACCO USE: ICD-10-CM

## 2019-12-16 DIAGNOSIS — G44.099 TRIGEMINAL AUTONOMIC CEPHALGIAS: ICD-10-CM

## 2019-12-16 DIAGNOSIS — G44.40 MEDICATION OVERUSE HEADACHE: ICD-10-CM

## 2019-12-16 DIAGNOSIS — G44.51 HEADACHE, HEMICRANIA CONTINUA: Primary | ICD-10-CM

## 2019-12-16 PROBLEM — IMO0002 CHRONIC MIGRAINE: Status: RESOLVED | Noted: 2019-10-17 | Resolved: 2019-12-16

## 2019-12-16 PROBLEM — G43.109 MIGRAINE WITH AURA AND WITHOUT STATUS MIGRAINOSUS, NOT INTRACTABLE: Status: RESOLVED | Noted: 2019-12-16 | Resolved: 2019-12-16

## 2019-12-16 PROBLEM — G43.109 MIGRAINE WITH AURA AND WITHOUT STATUS MIGRAINOSUS, NOT INTRACTABLE: Status: ACTIVE | Noted: 2019-12-16

## 2019-12-16 PROCEDURE — 99215 OFFICE O/P EST HI 40 MIN: CPT | Performed by: PSYCHIATRY & NEUROLOGY

## 2019-12-16 PROCEDURE — 99354 PR PROLONGED SVC OUTPATIENT SETTING 1ST HOUR: CPT | Performed by: PSYCHIATRY & NEUROLOGY

## 2019-12-16 RX ORDER — VENLAFAXINE 37.5 MG/1
TABLET ORAL
Qty: 30 TABLET | Refills: 6 | Status: SHIPPED | OUTPATIENT
Start: 2019-12-16

## 2019-12-16 RX ORDER — CYPROHEPTADINE HYDROCHLORIDE 4 MG/1
TABLET ORAL
Qty: 30 TABLET | Refills: 3 | Status: SHIPPED | OUTPATIENT
Start: 2019-12-16

## 2019-12-16 NOTE — PROGRESS NOTES
Review of Systems   Constitutional: Negative  HENT: Negative  Eyes: Positive for photophobia  Blurred Vision (Right Eye)    Respiratory: Negative  Cardiovascular: Negative  Gastrointestinal: Positive for nausea (Occasionally )  Endocrine: Negative  Genitourinary: Negative  Musculoskeletal: Positive for neck pain and neck stiffness  Skin: Negative  Allergic/Immunologic: Negative  Neurological: Positive for dizziness (Occasionally ) and headaches  Hematological: Negative  Psychiatric/Behavioral: Negative

## 2019-12-16 NOTE — PROGRESS NOTES
[facility]  PATIENT:  Opal Mason  MRN:  [id number]  :  1990  DATE OF SERVICE:  2019      Assessment/Plan:        Problem List Items Addressed This Visit        Cardiovascular and Mediastinum    Headache, hemicrania continua - Primary    Relevant Medications    magnesium oxide (MAG-OX) 400 mg    Riboflavin (VITAMIN B-2) 100 MG TABS    cyproheptadine (PERIACTIN) 4 mg tablet    venlafaxine (EFFEXOR) 37 5 mg tablet    Erenumab-aooe 140 MG/ML SOAJ       Other    Trigeminal autonomic cephalgias    Relevant Medications    magnesium oxide (MAG-OX) 400 mg    Riboflavin (VITAMIN B-2) 100 MG TABS    cyproheptadine (PERIACTIN) 4 mg tablet    venlafaxine (EFFEXOR) 37 5 mg tablet    Erenumab-aooe 140 MG/ML SOAJ    Medication overuse headache    Relevant Medications    magnesium oxide (MAG-OX) 400 mg    Riboflavin (VITAMIN B-2) 100 MG TABS    cyproheptadine (PERIACTIN) 4 mg tablet    venlafaxine (EFFEXOR) 37 5 mg tablet    Erenumab-aooe 140 MG/ML SOAJ    Marijuana use, continuous    Tobacco use    Cervicalgia    Relevant Orders    Ambulatory referral to Physical Therapy            Diagnosis: Suspect patient has  Medication overuse headaches (using Imitrex 25 mg or 50 mg almost on daily basis or at time will take indomethacin 25 mg for the last 2 years)  - Analgesic overuse can negate the effectiveness of headache preventive measures  Avoid medications with narcotics, barbiturates, or caffeine in them as these can cause rebound headaches after very few doses and can interfere with other headache medicine efficacy  Taking  any acute/abortive over the counter medication or prescription drugs for more than 2-3 days a week can cause medication overuse headache  Right-sided - Hemicrania continua  Chronic marijuana use: We do not recommend that patient use THC or CBD on daily basis as chronic use may cause memory loss  Intermittent use is recommended    Tobacco use:  Educated patient about cessation of tobacco and long-term affect of tobacco use  Preventive therapy for headaches:   - should see patient frequently to educate patient regarding weaning off of his abortive medications  Encourage patient to keep a headache diary for better assessment of how many headaches he is truly getting   -Over-the-counter supplements: to decrease intensity and frequency of migraines  - Magnesium Oxide 400mg twice a day  If any diarrhea or upset stomach, decrease dose  as tolerated  - Vitamin B2 200 mg twice a day  May cause the urine to turn yellow which is normal for B 2 to do and is not a sign that you are dehydrated  - On amovig 70mg - inform patient that we typically for chronic headaches use 140 mg dose  Will send out a new script for 140 mg to have patient use that for the next injection  - will wean patient off of zonagran by taking 1 tab at bedtime for 5 days then stop  - venlafaxine 37 5 mg in a m  daily continue increase as tolerated  - Given weather related headache and headaches related to change in seasons will have patient take cyproheptadine 4 mg quarter of a pill at bedtime and increase up gradually as tolerated to 1 tab  Did inform patient that it can cause lethargy and if this does happen patient is to stay on the lowest dose until he is seen in 2 months  Abortive therapy for headaches:   - at the onset of his headache patient may continue taking in her Imitrex or indomethacin but goal  With above treatment is to to wean off of this as chronic use either 25 mg of Imitrex or 25 mg some indomethacin will drive his pain  Work up:   - EKG-order today  - lab:  B12, vitamin-D, TSH, CBC, CMP-pending  With patient's insurance he can go to [facility] to get these done  Will give him address today so he may get them done  Headache management instructions  - When patient has a moderate to severe headache, they should seek rest, initiate relaxation and apply cold compresses to the head     - Maintain regular sleep schedule  Adults need at least 7-8 hours of uninterrupted a night  - Limit over the counter medications such as Tylenol, Ibuprofen, Aleve, Excedrin  (No more than 2- 3 times a week or max 10 a month)  - Maintain headache diary  Free JOJO for a smart phone, which can be used is "Migraine villa"  - Limit caffeine to 1-2 cups 8 to 16 oz a day or less  - Avoid dietary trigger  (aged cheese, peanuts, MSG, aspartame and nitrates)  - Patient is to have regular frequent meals to prevent headache onset  - Please drink at least 64 ounces of water a day to help remain hydrated  Neck pain:  Plans on going to a chiropractor  Will put a referral physical therapy as that may be a better option then seeing a chiropractor for neck pain   - Neck pathology and poor posture, with straightening of the normal cervical lordosis, can cause headaches  Tightening of the neck muscles can irritate the nerves in the occipital region of the head and cause or worsen head pain  Thus neck strengthening and relaxation exercises, can help improve this particular pain  It is importance to have good posture for improving shoulder, neck, and head pain  - Here are some exercises which should take 5 minutes:     1  Standing, drop your head to one side while continuing to look ahead  Hold for 10 seconds then swap sides  Repeat twice more each side  To increase the stretch, drop the opposite shoulder  2  Standing again, lower your chin to your chest, hold for 10 and then look up to the ceiling and hold for 10  Repeat twice more  3  Next, standing straight again, look over your right shoulder and hold firm for 10 seconds, then over your left shoulder for 10  Repeat this 3 times  4  Finally, while sitting upright, bring your head forward and hold for 10, then all the way back and hold for 10  If this simple exercise does not help improve the posture, we will consider formal physical therapy in the future  Please call with any questions or concerns  Office number is [phone] Prescription Drug Monitoring Program report was reviewed and was appropriate   Fill Date ID Written Drug Qty Days Prescriber Rx # Pharmacy Refill Daily Dose * Pymt Type    06/04/2019  1   06/03/2019  Acetaminophen-Cod #3 Tablet  20 00 3 Ca Gal  [id number]   [facility] ([id number])  0  30 00 MME  [place]  [place]   05/23/2019  1   05/23/2019  Hydrocodone-Acetamin 5-325 MG  10 00 1 Ma Consuelo  [id number]   [facility] ([id number])  0  50 00 MME  [place]  [place]         History of Present Illness: We had the pleasure of evaluating Guillermo Melgar in neurological consultation today for headaches  As you know,  he is a 30 y o  right handed  male  [facility] and does environmental teaching  He is here today for evaluation of his headache  Other presenting illness:  QTC: none in chart to review  Last time pt had colonoscopy: none  Tobacco use: yes, about half a pack per day since he was 27 YO age  Hemicrania continua  Medication overuse headaches  Cervicalgia    Mood:   Depression: No  Anxiety: No   Seeing a psychiatrist/ How often ? none   Seeing at therapist/ how often? none    Headaches:   Any family history of migraines? maternal grandmother  Any family history of aneurysms?  none    Have you seen someone else for headaches or pain? Robert Uss and Dr Ben Porter in [place], [place]  Headaches started at what age? 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 2/10    How often do the headaches occur?daily  Mild headaches: 3-4 a week  Moderate to severe headaches: 3-4 a week    Are you ever headache free? Yes, every morning he has no headache and then starts at 10 am    Aura/Warning and how long does it last? none    What time of the day do the headaches start?    Mild headaches: 10 am , at time he can also have a headaches at 3:00 am  Moderate to severe headaches: Later afternoon or 4 to 5 pm    How long do the headaches last? He is taking indomethacin or Sumatriptan 25 mg on daily basis  Mild headaches: once it starts it will last for 1-3 hours - he will take Imitrex 100 mg quarter of a pill and if not that he will take indomethacin 25 mg one to two tabs  Moderate to severe headaches: 1-3 hours with Imitrex or indomethacin  Describe your usual headache? Mild headaches: pressure, achy, tight band  Moderate to severe headaches: Throbbing, pulsing, shooting, stabbing    Where is your headache located? Both headaches are on the right side of his head and once had pain on the left side of his head    What is the intensity of pain? Mild headaches:  3/10  Moderate to severe headaches: 7/10    Associated symptoms:   - Decreased appetite   Nausea      - Photophobia     Phonophobia      Osmophobia  - Lacrimation, typically pale face but can be flushed    - Stiff or sore neck   - Dizziness   light headed  - Problems with concentration  - Blurred vision     - Ptosis       - Prefer to be in a cool, quiet, dark room    Number of days missed per month because of headaches:  Work (or school) days: this year 4-5   Social or Family activities: none typically    Headache are worse if the patient: cough, sneeze, bending over, exertion  Headache triggers:  Stress, missing meals, exercise to much, sunlight, fatigue, weather changes, spicy food  What time of the year do headaches occur more frequently? Yes with change in season    Have you had trigger point injection performed and how often? No  Have you had Botox injection performed and how often? No   Have you had epidural injections or transforaminal injections performed? No    Alternative therapies used in the past for headaches?  chiropractor  Have you used CBD or THC for your headaches and how often?  Yes, both daily for the last two years - feels it helps  How many caffeine products to drink a day? Not coffee but can drink tea one cup and drinks one can  How much water to drink a day? 16 ox  2-3 a day    What medications do you take or have you taken for your headaches? Preventive therapy:   - magnesium, vitamin B2  - Amovig 70 mg and Emgality 140mg (not covered),   - Zonegran 100 mg,  - verapamil 120 mg  - amitriptyline (cause significant sedation at high dose)  Abortive Therapy:   - Fioricet  - Decadron 2 mg  - indomethacin 25 mg t i d ,  - sumatriptan 6 mg injection/100 mg p o ,             Neck pain:  What is your current neck pain? Yes    When did the neck pain start? Some time now    How often does neck pain occur? Almost daily  What is the intensity of your neck pain (from pain scale of 1-10)? Where is the neck pain located? Base of the head and neck  Have you noticed decreased range of movement in your neck? No  Does your neck pain cause you to have headaches? Sometimes    Have you ever had any Brain imaging? Yes  - Reviewed old notes from physician seen in the past  - Reviewed images final report  Medications and allergies were reviewed  10/02/2019-MRI of brain without contrast:  [facility]  Positive on the images:  - Paranasal sinuses light mastoid:  Mild mucosal thickening maxillary, sphenoid, ethmoid and frontal sinuses  Mastoids are clear  - Minimal degenerative changes, anterior atlantoaxial joint  - small retention cyst midline nasopharynx  Flow voids at the base of the skull appear intact globes appear intact  Impression:  No focal acute intracranial pathology noted    Mild pan sinus mucosal thickening      Past Medical History:   Diagnosis Date    Chronic migraine     Migraines        Patient Active Problem List   Diagnosis    Trigeminal autonomic cephalgias    Headache, hemicrania continua    Medication overuse headache    Marijuana use, continuous    Tobacco use    Cervicalgia       Medications:      Current Outpatient Medications Medication Sig Dispense Refill    indomethacin (INDOCIN) 25 mg capsule 1-2 tabs TID prn headache with food 20 capsule 0    SUMAtriptan (IMITREX) 100 mg tablet 1 tab at migraine onset, repeat after 2 hours if needed  No more than 2 per 24 hours or 3 per week  9 tablet 0    cyproheptadine (PERIACTIN) 4 mg tablet Quarter of a tab at bedtime for 3 days then half a tab at bedtime for 3 days and continue to increase up as tolerated to 1 tab at bedtime 30 tablet 3    Erenumab-aooe 140 MG/ML SOAJ Use 140 mg autoinjector monthly 1 pen 6    magnesium oxide (MAG-OX) 400 mg Take 1 tablet (400 mg total) by mouth 2 (two) times a day 60 tablet 3    Riboflavin (VITAMIN B-2) 100 MG TABS 2 in the morning and 2 at bedtime 120 tablet 4    venlafaxine (EFFEXOR) 37 5 mg tablet 1 tab in a m  daily 30 tablet 6     No current facility-administered medications for this visit  Allergies:       Allergies   Allergen Reactions    Amitriptyline Fatigue and Drowsiness    Propranolol      NO RELIEF     Topamax [Topiramate] Other (See Comments)     Rapid weight loss       Family History:     Family History   Problem Relation Age of Onset    No Known Problems Mother     No Known Problems Father     No Known Problems Sister        Social History:     Social History     Socioeconomic History    Marital status: Single     Spouse name: Not on file    Number of children: Not on file    Years of education: Not on file    Highest education level: Not on file   Occupational History    Not on file   Social Needs    Financial resource strain: Not on file    Food insecurity:     Worry: Not on file     Inability: Not on file    Transportation needs:     Medical: Not on file     Non-medical: Not on file   Tobacco Use    Smoking status: Current Every Day Smoker     Packs/day: 0 50    Smokeless tobacco: Never Used   Substance and Sexual Activity    Alcohol use: No    Drug use: Yes     Types: Marijuana     Comment: Medical     Sexual activity: Not on file   Lifestyle    Physical activity:     Days per week: Not on file     Minutes per session: Not on file    Stress: Not on file   Relationships    Social connections:     Talks on phone: Not on file     Gets together: Not on file     Attends Sabianism service: Not on file     Active member of club or organization: Not on file     Attends meetings of clubs or organizations: Not on file     Relationship status: Not on file    Intimate partner violence:     Fear of current or ex partner: Not on file     Emotionally abused: Not on file     Physically abused: Not on file     Forced sexual activity: Not on file   Other Topics Concern    Not on file   Social History Narrative    Not on file         Objective:   Physical Exam:                                                                   Vitals:               /71 (BP Location: Left arm, Patient Position: Sitting, Cuff Size: Standard)   Pulse 87   Ht 5' 8" (1 727 m)   Wt 63 kg (139 lb)   BMI 21 13 kg/m²   BP Readings from Last 3 Encounters:   12/16/19 107/71   10/17/19 112/70   06/26/18 112/78     Pulse Readings from Last 3 Encounters:   12/16/19 87   10/17/19 81   06/26/18 91              CONSTITUTIONAL: Well developed, well nourished, well groomed  No dysmorphic features  Eyes:  PERRLA, EOM normal      Neck:  Normal ROM, neck supple  HEENT:  Normocephalic atraumatic  No meningismus  Oropharynx is clear and moist  No oral mucosal lesions  Chest:  Respirations regular and unlabored  Cardiovascular:  Distal extremities warm without palpable edema or tenderness, no observed significant swelling  Musculoskeletal:  Full range of motion  Skin:  warm and dry   Psychiatric:  Normal behavior and appropriate affect        Neurological Examination:     Mental status/cognitive function: Orientated to time, place and person       Cranial Nerves: 2 to 12 intact    Motor Exam:    5/5 right upper extremity  5/5 left upper extremity  5/5 right lower extremity  5/5 left lower extremity    Sensory:   - grossly intact light touch in all extremities  Reflexes:   2/4 right upper extremity  2/4 left upper extremity  2/4 right lower extremity  2/4 left lower extremity    No clonus noted    Coordination:   - Finger to nose intact bilaterally  - No tremor noted    Gait: steady casual  gait, able to do tandem gait, romberg negative  Review of Systems:   Review of Systems  Review of Systems   Constitutional: Negative  HENT: Negative  Eyes: Positive for photophobia  Blurred Vision (Right Eye)    Respiratory: Negative  Cardiovascular: Negative  Gastrointestinal: Positive for nausea (Occasionally )  Endocrine: Negative  Genitourinary: Negative  Musculoskeletal: Positive for neck pain and neck stiffness  Skin: Negative  Allergic/Immunologic: Negative  Neurological: Positive for dizziness (Occasionally ) and headaches  Hematological: Negative  Psychiatric/Behavioral: Negative  I have spent 60 minutes with Patient  today in which greater than 50% of this time was spent in counseling/coordination of care regarding Diagnostic results, Prognosis, Risks and benefits of tx options, Intructions for management, Patient and family education, Importance of tx compliance, Risk factor reductions, Impressions and Plan of care as above        Author:  Yonatan Funk MD 12/16/2019 2:09 PM

## 2019-12-16 NOTE — PATIENT INSTRUCTIONS
Diagnosi  Diagnosis: Suspect patient has  Medication overuse headaches (using Imitrex 25 mg or 50 mg almost on daily basis or at time will take indomethacin 25 mg for the last 2 years)  - Analgesic overuse can negate the effectiveness of headache preventive measures  Avoid medications with narcotics, barbiturates, or caffeine in them as these can cause rebound headaches after very few doses and can interfere with other headache medicine efficacy  Taking  any acute/abortive over the counter medication or prescription drugs for more than 2-3 days a week can cause medication overuse headache  Right-sided - Hemicrania continua  Chronic marijuana use: We do not recommend that patient use THC or CBD on daily basis as chronic use may cause memory loss  Intermittent use is recommended  Tobacco use:  Educated patient about cessation of tobacco and long-term affect of tobacco use  Preventive therapy for headaches:   - should see patient frequently to educate patient regarding weaning off of his abortive medications  Encourage patient to keep a headache diary for better assessment of how many headaches he is truly getting   -Over-the-counter supplements: to decrease intensity and frequency of migraines  - Magnesium Oxide 400mg twice a day  If any diarrhea or upset stomach, decrease dose  as tolerated  - Vitamin B2 200 mg twice a day  May cause the urine to turn yellow which is normal for B 2 to do and is not a sign that you are dehydrated  - On amovig 70mg - inform patient that we typically for chronic headaches use 140 mg dose  Will send out a new script for 140 mg to have patient use that for the next injection  - will wean patient off of zonagran by taking 1 tab at bedtime for 5 days then stop  - venlafaxine 37 5 mg in a m  daily continue increase as tolerated    - Given weather related headache and headaches related to change in seasons will have patient take cyproheptadine 4 mg quarter of a pill at bedtime and increase up gradually as tolerated to 1 tab  Did inform patient that it can cause lethargy and if this does happen patient is to stay on the lowest dose until he is seen in 2 months  Abortive therapy for headaches:   - at the onset of his headache patient may continue taking in her Imitrex or indomethacin but goal  With above treatment is to to wean off of this as chronic use either 25 mg of Imitrex or 25 mg some indomethacin will drive his pain  Work up:   - EKG-order today  - lab:  B12, vitamin-D, TSH, CBC, CMP-pending  With patient's insurance he can go to [facility] to get these done  Will give him address today so he may get them done  Headache management instructions  - When patient has a moderate to severe headache, they should seek rest, initiate relaxation and apply cold compresses to the head  - Maintain regular sleep schedule  Adults need at least 7-8 hours of uninterrupted a night  - Limit over the counter medications such as Tylenol, Ibuprofen, Aleve, Excedrin  (No more than 2- 3 times a week or max 10 a month)  - Maintain headache diary  Free JOJO for a smart phone, which can be used is "Migraine villa"  - Limit caffeine to 1-2 cups 8 to 16 oz a day or less  - Avoid dietary trigger  (aged cheese, peanuts, MSG, aspartame and nitrates)  - Patient is to have regular frequent meals to prevent headache onset  - Please drink at least 64 ounces of water a day to help remain hydrated  Neck pain:  Plans on going to a chiropractor  Will put a referral physical therapy as that may be a better option then seeing a chiropractor for neck pain   - Neck pathology and poor posture, with straightening of the normal cervical lordosis, can cause headaches  Tightening of the neck muscles can irritate the nerves in the occipital region of the head and cause or worsen head pain  Thus neck strengthening and relaxation exercises, can help improve this particular pain   It is importance to have good posture for improving shoulder, neck, and head pain  - Here are some exercises which should take 5 minutes:     1  Standing, drop your head to one side while continuing to look ahead  Hold for 10 seconds then swap sides  Repeat twice more each side  To increase the stretch, drop the opposite shoulder  2  Standing again, lower your chin to your chest, hold for 10 and then look up to the ceiling and hold for 10  Repeat twice more  3  Next, standing straight again, look over your right shoulder and hold firm for 10 seconds, then over your left shoulder for 10  Repeat this 3 times  4  Finally, while sitting upright, bring your head forward and hold for 10, then all the way back and hold for 10  If this simple exercise does not help improve the posture, we will consider formal physical therapy in the future  Please call with any questions or concerns   Office number is [phone] Prescription Drug Monitoring Program report was reviewed and was appropriate   Fill Date ID Written Drug Qty Days Prescriber Rx # Pharmacy Refill Daily Dose * Pymt Type    06/04/2019  1   06/03/2019  Acetaminophen-Cod #3 Tablet  20 00 3 Ca Gal  [id number]   [facility] ([id number])  0  30 00 MME  [place]  [place]   05/23/2019  1   05/23/2019  Hydrocodone-Acetamin 5-325 MG  10 00 1 Ma Consuelo  [id number]   [facility] ([id number])  0  50 00 MME  [place]  [place]

## 2019-12-17 ENCOUNTER — TELEPHONE (OUTPATIENT)
Dept: NEUROLOGY | Facility: CLINIC | Age: 29
End: 2019-12-17

## 2019-12-17 NOTE — TELEPHONE ENCOUNTER
Fax received from pharmacy  [facility] requires [employer]ARDING submitted through [facility]'S CL  Schmidt: Ivania Keith  Awaiting determination      [phone] - [facility]

## 2020-01-10 LAB
1,25(OH)2D3 SERPL-MCNC: 55.2 PG/ML (ref 19.9–79.3)
ALBUMIN SERPL-MCNC: 4.5 G/DL (ref 3.5–5.5)
ALBUMIN/GLOB SERPL: 2 {RATIO} (ref 1.2–2.2)
ALP SERPL-CCNC: 94 IU/L (ref 39–117)
ALT SERPL-CCNC: 31 IU/L (ref 0–44)
AST SERPL-CCNC: 21 IU/L (ref 0–40)
BASOPHILS # BLD AUTO: 0 X10E3/UL (ref 0–0.2)
BASOPHILS NFR BLD AUTO: 0 %
BILIRUB SERPL-MCNC: 0.2 MG/DL (ref 0–1.2)
BUN SERPL-MCNC: 16 MG/DL (ref 6–20)
BUN/CREAT SERPL: 16 (ref 9–20)
CALCIUM SERPL-MCNC: 9.7 MG/DL (ref 8.7–10.2)
CHLORIDE SERPL-SCNC: 99 MMOL/L (ref 96–106)
CO2 SERPL-SCNC: 23 MMOL/L (ref 20–29)
CREAT SERPL-MCNC: 0.97 MG/DL (ref 0.76–1.27)
EOSINOPHIL # BLD AUTO: 0.1 X10E3/UL (ref 0–0.4)
EOSINOPHIL NFR BLD AUTO: 2 %
ERYTHROCYTE [DISTWIDTH] IN BLOOD BY AUTOMATED COUNT: 13.3 % (ref 11.6–15.4)
GLOBULIN SER-MCNC: 2.2 G/DL (ref 1.5–4.5)
GLUCOSE SERPL-MCNC: 97 MG/DL (ref 65–99)
HCT VFR BLD AUTO: 43.9 % (ref 37.5–51)
HGB BLD-MCNC: 15.4 G/DL (ref 13–17.7)
IMM GRANULOCYTES # BLD: 0 X10E3/UL (ref 0–0.1)
IMM GRANULOCYTES NFR BLD: 0 %
LYMPHOCYTES # BLD AUTO: 3.4 X10E3/UL (ref 0.7–3.1)
LYMPHOCYTES NFR BLD AUTO: 43 %
MCH RBC QN AUTO: 32.2 PG (ref 26.6–33)
MCHC RBC AUTO-ENTMCNC: 35.1 G/DL (ref 31.5–35.7)
MCV RBC AUTO: 92 FL (ref 79–97)
MONOCYTES # BLD AUTO: 0.8 X10E3/UL (ref 0.1–0.9)
MONOCYTES NFR BLD AUTO: 10 %
NEUTROPHILS # BLD AUTO: 3.6 X10E3/UL (ref 1.4–7)
NEUTROPHILS NFR BLD AUTO: 45 %
PLATELET # BLD AUTO: 249 X10E3/UL (ref 150–450)
POTASSIUM SERPL-SCNC: 4.1 MMOL/L (ref 3.5–5.2)
PROT SERPL-MCNC: 6.7 G/DL (ref 6–8.5)
RBC # BLD AUTO: 4.79 X10E6/UL (ref 4.14–5.8)
SL AMB EGFR AFRICAN AMERICAN: 122 ML/MIN/1.73
SL AMB EGFR NON AFRICAN AMERICAN: 106 ML/MIN/1.73
SODIUM SERPL-SCNC: 138 MMOL/L (ref 134–144)
TSH SERPL DL<=0.005 MIU/L-ACNC: 0.48 UIU/ML (ref 0.45–4.5)
VIT B12 SERPL-MCNC: 512 PG/ML (ref 232–1245)
WBC # BLD AUTO: 8 X10E3/UL (ref 3.4–10.8)